# Patient Record
Sex: FEMALE | Race: WHITE | NOT HISPANIC OR LATINO | URBAN - METROPOLITAN AREA
[De-identification: names, ages, dates, MRNs, and addresses within clinical notes are randomized per-mention and may not be internally consistent; named-entity substitution may affect disease eponyms.]

---

## 2023-06-05 ENCOUNTER — APPOINTMENT (RX ONLY)
Dept: URBAN - METROPOLITAN AREA CLINIC 102 | Facility: CLINIC | Age: 33
Setting detail: DERMATOLOGY
End: 2023-06-05

## 2023-06-05 DIAGNOSIS — L40.0 PSORIASIS VULGARIS: ICD-10-CM

## 2023-06-05 PROCEDURE — 99214 OFFICE O/P EST MOD 30 MIN: CPT

## 2023-06-05 PROCEDURE — ? PRESCRIPTION MEDICATION MANAGEMENT

## 2023-06-05 PROCEDURE — ? PRESCRIPTION

## 2023-06-05 PROCEDURE — ? COUNSELING

## 2023-06-05 RX ORDER — FLUOCINONIDE 0.5 MG/ML
SOLUTION TOPICAL
Qty: 60 | Refills: 1 | Status: ERX | COMMUNITY
Start: 2023-06-05

## 2023-06-05 RX ADMIN — FLUOCINONIDE: 0.5 SOLUTION TOPICAL at 00:00

## 2023-06-05 NOTE — PROCEDURE: PRESCRIPTION MEDICATION MANAGEMENT
Initiate Treatment: NBUVB with Lifshitz\\nFluocinonide Soln QHS
Render In Strict Bullet Format?: No
Detail Level: Zone

## 2023-06-05 NOTE — HPI: OTHER
Condition:: Follow up
Please Describe Your Condition:: Psoriasis\\nPatient is 12 1/2 weeks pregnant. \\nSees rheumatologist Dr. Raghu Jack and advised to be on topical medication while off Embriel while pregnant.